# Patient Record
Sex: MALE | Race: WHITE
[De-identification: names, ages, dates, MRNs, and addresses within clinical notes are randomized per-mention and may not be internally consistent; named-entity substitution may affect disease eponyms.]

---

## 2020-02-27 PROBLEM — Z00.00 ENCOUNTER FOR PREVENTIVE HEALTH EXAMINATION: Status: ACTIVE | Noted: 2020-02-27

## 2020-03-02 ENCOUNTER — APPOINTMENT (OUTPATIENT)
Dept: COLORECTAL SURGERY | Facility: CLINIC | Age: 40
End: 2020-03-02
Payer: COMMERCIAL

## 2020-03-02 VITALS
BODY MASS INDEX: 22.73 KG/M2 | HEIGHT: 68 IN | OXYGEN SATURATION: 97 % | HEART RATE: 68 BPM | WEIGHT: 150 LBS | SYSTOLIC BLOOD PRESSURE: 108 MMHG | DIASTOLIC BLOOD PRESSURE: 72 MMHG

## 2020-03-02 DIAGNOSIS — Z86.19 PERSONAL HISTORY OF OTHER INFECTIOUS AND PARASITIC DISEASES: ICD-10-CM

## 2020-03-02 DIAGNOSIS — Z80.9 FAMILY HISTORY OF MALIGNANT NEOPLASM, UNSPECIFIED: ICD-10-CM

## 2020-03-02 DIAGNOSIS — G47.30 SLEEP APNEA, UNSPECIFIED: ICD-10-CM

## 2020-03-02 DIAGNOSIS — Z82.61 FAMILY HISTORY OF ARTHRITIS: ICD-10-CM

## 2020-03-02 PROCEDURE — 46922 EXCISION OF ANAL LESION(S): CPT

## 2020-03-02 PROCEDURE — 99203 OFFICE O/P NEW LOW 30 MIN: CPT | Mod: 25

## 2020-03-02 RX ORDER — EMTRICITABINE AND TENOFOVIR DISOPROXIL FUMARATE 167; 250 MG/1; MG/1
TABLET, FILM COATED ORAL
Refills: 0 | Status: ACTIVE | COMMUNITY

## 2020-03-02 NOTE — ASSESSMENT
[FreeTextEntry1] : I have reviewed with the patient the clinical and natural history of human papilloma virus and its relationship to the anus. The risks and associated consequences including sexual transmission, anal warts, anal dysplasia and risk of anal cancer have been outlined. The need for long term surveillance and follow up have been detailed. \par \par Patient is completing Gardasil series. Patient sees Dr Scott who patient states has a smaller anoscope that accommodates his stenosis and is able to complete internal exam\par \par Office based excision of perianal skin lesion performed today\par F/u pathology\par Continue surveillance with Dr Scott\par Further recommendations pending pathology results\par All questions were answered, patient expressed understanding, and is agreeable to this plan.\par \par

## 2020-03-02 NOTE — CONSULT LETTER
[Dear  ___] : Dear  [unfilled], [Consult Letter:] : I had the pleasure of evaluating your patient, [unfilled]. [( Thank you for referring [unfilled] for consultation for _____ )] : Thank you for referring [unfilled] for consultation for [unfilled] [Please see my note below.] : Please see my note below. [Consult Closing:] : Thank you very much for allowing me to participate in the care of this patient.  If you have any questions, please do not hesitate to contact me. [Sincerely,] : Sincerely, [FreeTextEntry2] : Dr. Pedro Luis Moreau\par [address] [place] [address] [address]\[place] [address] [FreeTextEntry3] :  Beatriz Ernst MD\par

## 2020-03-02 NOTE — PROCEDURE
[FreeTextEntry1] : Excision of Anal Condyloma\par \par Pre-procedure Diagnosis: Condylomatous appearing lesions in perianal region\par Post-procedure Diagnosis: Condylomatous appearing lesions in perianal region\par Procedure: Excision of anal condyloma\par Anesthesia: 1% Lidocaine with epinephrine\par Estimated blood loss: minimal\par Specimen: perianal lesion - rule out anal condyloma\par Drain: none\par Complications: none\par \par Indications: Patient presents with history of a lump in the perianal lesion and known history of HPV. On physical exam,  condylomatous appearing lesions were visualized. Risks/benefits/alternatives were discussed and patient agreed to proceed with office based procedure.\par \par Procedure Details: Consent obtained. Patient was placed in a modified prone mary-knife position on the examination table. Upon inspection, a condylomatous appearing lesion was noted in the left lateral, right posterior and anterior midline positions.  Betadine was used to clean the perianal skin and the associated skin was injected with local anesthetic. An excisional biopsy was performed to remove the lesions, which were sent to pathology as specimen individually labeled by location. The base of the wound was cauterized using silver nitrate to achieve hemostasis. The patient tolerated the procedure well.\par \par Post-procedure instructions were reviewed with the patient, including local wound care. All questions answered.\par

## 2020-03-02 NOTE — PHYSICAL EXAM
[FreeTextEntry1] :  Medical assistant present for duration of physical examination\par \par Gen NAD, AOx3\par \par Anorectal Exam:\par Inspection no erythema, induration or fluctuance, no skin excoriation, no fissure, left lateral perianal skin with 7mm area of flat coarse skin changes (patient confirms this is area of concern prompting evaluation), 2-3mm anterior midline condylomatous appearing lesion, 2-3mm right posterior condylomatous appearing lesion\par HARPER nontender, no masses palpated, no blood on gloved finger, stenosis noted with circumferential fibrotic ring\par Anoscopy attempted but unable to easily pass adult anoscope due to stenosis\par \par

## 2020-05-07 ENCOUNTER — APPOINTMENT (OUTPATIENT)
Dept: COLORECTAL SURGERY | Facility: CLINIC | Age: 40
End: 2020-05-07

## 2020-06-25 ENCOUNTER — APPOINTMENT (OUTPATIENT)
Dept: COLORECTAL SURGERY | Facility: CLINIC | Age: 40
End: 2020-06-25
Payer: COMMERCIAL

## 2020-06-25 VITALS
TEMPERATURE: 98.3 F | HEART RATE: 112 BPM | DIASTOLIC BLOOD PRESSURE: 77 MMHG | SYSTOLIC BLOOD PRESSURE: 117 MMHG | BODY MASS INDEX: 21.07 KG/M2 | HEIGHT: 68 IN | WEIGHT: 139 LBS

## 2020-06-25 DIAGNOSIS — A63.0 ANOGENITAL (VENEREAL) WARTS: ICD-10-CM

## 2020-06-25 PROCEDURE — 99213 OFFICE O/P EST LOW 20 MIN: CPT

## 2020-06-25 NOTE — PHYSICAL EXAM
[FreeTextEntry1] : Medical assistant present for duration of physical examination\par \par Gen NAD, AOx3\par Nonlabored breathing\par Ambulating without assistance\par Skin normal color and pigment, no visible lesions or rashes\par \par Anorectal Exam:\par Inspection no erythema, induration or fluctuance, no skin excoriation, no fissure, no perianal lesions visualized or palpated, well healed excision sites\par HARPER nontender, no masses palpated, no blood on gloved finger, stenosis noted with circumferential fibrotic ring, able to accommodate gloved index finger\par Anoscopy attempted but unable to easily pass adult anoscope due to stenosis\par \par

## 2020-06-25 NOTE — HISTORY OF PRESENT ILLNESS
[FreeTextEntry1] : 40 y/o M presents for f/u anal condyloma\par Last seen in the office 3/2/20, excision of condylomatous appearing lesions performed \par \par           1. Right posterior perianal, shave biopsy:\par - Consistent with condyloma acuminatum, see note.\par \par           Note: Clinical correlation is recommended.\par \par           2. Anterior midline perianal skin, shave biopsy:\par               - Consistent with condyloma acuminatum, see note.\par \par           Note: Clinical correlation is recommended.\par \par           3. Left lateral perianal skin, shave biopsy:\par               - Consistent with condyloma acuminatum, see note.\par \par           Note: Clinical correlation is recommended.\par \par H/o HPV that required OR based excision and fulguration with Dr. Deepak Scott 9/18. Developed hemorrhoids following procedure which were treated with additional procedure. Following additional procedures, patient developed anal stenosis. \par \par Self dilating at home with anal plugs previously, has not been doing since last visit.\par Has not seen Dr Scott since last visit.\par Avoiding anal receptive intercourse\par \par BH: unchanged, using miralax to assist with daily BMs\par \par Completed  Gardasil series since last visit\par \par Denies pain, BRB or perianal lumps on self exam\par \par Had COVID 19 clinically, got symptoms 3/7/2020. Was managed as outpatient, sees PCP in follow up. Was not tested with PCR because early in pandemic, but has had antibody testing that is (+) for antibodies\par

## 2020-06-25 NOTE — ASSESSMENT
[FreeTextEntry1] : Patient reassured. Pathology discussed. No clinical lesions at this time.\par \par I have reviewed with the patient the clinical and natural history of human papilloma virus and its relationship to the anus. The risks and associated consequences including sexual transmission, anal warts, anal dysplasia and risk of anal cancer have been outlined. The need for long term surveillance and follow up have been detailed. \par \par Resume at home anal dilation\par Importance of anoscopy reinforced with patient as part of surveillance. \par Cannot accommodate adult anoscope in office at this time. \par Patient state Dr Scott has a smaller anoscope that accommodates his stenosis and is able to complete internal exam. Advise f/u with Dr Scott. Alternatively, discussed with patient EUA can be performed, with anal dilation and anoscopy at time of procedure. \par \par All questions were answered, patient expressed understanding, and is agreeable to this plan.\par

## 2020-08-18 ENCOUNTER — APPOINTMENT (OUTPATIENT)
Dept: COLORECTAL SURGERY | Facility: CLINIC | Age: 40
End: 2020-08-18
Payer: COMMERCIAL

## 2020-08-18 VITALS
BODY MASS INDEX: 21.22 KG/M2 | TEMPERATURE: 98.4 F | SYSTOLIC BLOOD PRESSURE: 115 MMHG | HEART RATE: 80 BPM | DIASTOLIC BLOOD PRESSURE: 78 MMHG | OXYGEN SATURATION: 96 % | HEIGHT: 68 IN | WEIGHT: 140 LBS

## 2020-08-18 PROCEDURE — 99213 OFFICE O/P EST LOW 20 MIN: CPT | Mod: 25

## 2020-08-18 PROCEDURE — 46600 DIAGNOSTIC ANOSCOPY SPX: CPT

## 2020-08-18 NOTE — ASSESSMENT
[FreeTextEntry1] : Patient reassured\par Normal external perianal exam\par Anoscopy without identifiable lesions\par Resume at home anal dilation\par Continue bowel regimen \par F/u in 1 year for continued surveillance or sooner as needed if symptoms arise\par All questions were answered, patient expressed understanding, and is agreeable to this plan.\par

## 2020-08-18 NOTE — PHYSICAL EXAM
[FreeTextEntry1] : Medical assistant present for duration of physical examination\par \par Gen no acute distress, alert and oriented\par Psych calm, pleasant demeanor, responding appropriately to question\par Nonlabored breathing\par Ambulating without assistance\par Skin normal color and pigment, no visible lesions or rashes\par \par Anorectal Exam:\par Inspection no erythema, induration or fluctuance, no skin excoriation, no fissure, no external perianal lesions visualized\par HARPER nontender, no masses palpated, no blood on gloved finger, circumferential fibrotic ring palpated consistent with patient's known stenosis, gloved index finger was used to massage this stenosis\par \par Procedure: Anoscopy with dilation\par \par Pre procedure Diagnosis: anal stenosis, h/o anal condyloma\par Post procedure Diagnosis: anal stenosis, h/o anal condyloma\par Anesthesia: none\par Estimated blood loss: none\par Specimen: none\par Complications: none\par \par Consent obtained. Anoscopy with gentle dilation was performed until a lighted adult anoscope with lubricant jelly was able to be inserted into the anal canal and the entire anal mucosal surface was inspected. Findings included no fissure, nonfriable internal hemorrhoids, no visible masses or lesions, fibrotic circumferential ring of scar tissue consistent with patient's known stenosis\par \par Patient tolerated examination and procedure well.\par \par \par

## 2020-08-18 NOTE — HISTORY OF PRESENT ILLNESS
[FreeTextEntry1] : 39 yo M presents for f/u anal condyloma\par s/p office based excision of condylomatous appearing lesions on 3/2/20 w/ pathology c/w condyloma\par Last seen 6/25/20 for follow up\par Reports he felt bumps 3-4 weeks ago, concerned condyloma have returned\par Denies pain or BPR\par Occasional itching\par \par Hx of HPV that required OR based excision and fulguration with Dr. Deepak Scott 9/2018. Developed hemorrhoids following procedure which were treated with additional procedure. Following additional procedures, patient developed anal stenosis. \par \par Self dilating at home with anal plugs previously, has not performed for the last year as he is concerned about reintroducing warts into the anal canal which \par Has not seen Dr Scott since last visit, will schedule follow up with him if dilation is needed\par Avoiding anal receptive intercourse as would not be able to accommodate.\par Completed Gardasil vaccine series\par \par BH: few attempts daily. Takes miralax daily\par Admits could improve dietary fiber intake, drinks 4-6 cups daily\par Denies ASA/NSAID use

## 2021-02-09 PROBLEM — Z86.16 HISTORY OF COVID-19: Status: RESOLVED | Noted: 2021-02-09 | Resolved: 2021-02-09

## 2021-02-11 ENCOUNTER — APPOINTMENT (OUTPATIENT)
Dept: COLORECTAL SURGERY | Facility: CLINIC | Age: 41
End: 2021-02-11
Payer: COMMERCIAL

## 2021-02-11 VITALS
BODY MASS INDEX: 22.73 KG/M2 | TEMPERATURE: 97.6 F | WEIGHT: 150 LBS | SYSTOLIC BLOOD PRESSURE: 113 MMHG | HEART RATE: 96 BPM | HEIGHT: 68 IN | DIASTOLIC BLOOD PRESSURE: 77 MMHG

## 2021-02-11 DIAGNOSIS — Z86.16 PERSONAL HISTORY OF COVID-19: ICD-10-CM

## 2021-02-11 PROCEDURE — 99072 ADDL SUPL MATRL&STAF TM PHE: CPT

## 2021-02-11 PROCEDURE — 46600 DIAGNOSTIC ANOSCOPY SPX: CPT

## 2021-02-11 NOTE — PHYSICAL EXAM
[FreeTextEntry1] : Medical assistant present for duration of physical examination\par \par Gen NAD, AOx3\par Nonlabored breathing\par Ambulating without assistance\par Skin normal color and pigment, no visible lesions or rashes\par \par Anal pap performed\par \par Anorectal Exam\par Inspection no erythema, induration or fluctuance, no skin excoriation, no fissures, no perianal lesions visualized or palpated\par HARPER nontender, no masses palpated, no blood on gloved finger, mild stenosis noted with fibrosis noted left lateral extending posteriorly to right posterior quadrant, able to easily accommodate gloved index finger, gentle massage performed\par \par Procedure: Anoscopy\par \par Pre procedure Diagnosis: anal stenosis, h/o anal condyloma\par Post procedure Diagnosis: anal stenosis, h/o anal condyloma\par Anesthesia: none\par Estimated blood loss: none\par Specimen: none\par Complications: none\par \par Consent obtained. Anoscopy was performed by passing a lighted anoscope with lubricant jelly into the anal canal and the entire anal mucosal surface was inspected. Standard anoscope was able to be easily inserted into anal canal. Findings included no fissure, nonfriable internal hemorrhoids, no visible masses or lesions, posterior and left sided fibrosis consistent with patient's known stenosis.\par \par Patient tolerated examination and procedure well.\par \par \par \par

## 2021-02-11 NOTE — ASSESSMENT
[FreeTextEntry1] : 39 yo M with history of HPV associated condyloma and anal stenosis.\par \par I have reviewed with the patient the clinical and natural history of human papilloma virus and its relationship to the anus. The risks and associated consequences including sexual transmission, anal warts, anal dysplasia and risk of anal cancer have been outlined. The need for long term surveillance and follow up have been detailed. \par \par No evidence of condyloma or lesions at this time. Patient is approximately one year removed from last excision and fulguration of lesions. Anal pap performed today. Discussed with patient that further surveillance will depend on results of pap test but anticipate yearly follow up, or sooner if lesions present.\par \par Patient has been doing home dilation. Reiterated value of doing dilation at home (vs undergoing procedure and sedation for similar dilations). Discussed the potential role of a flap or stricturoplasty, however patient currently able to accommodate a finger and anoscope exam with current practices, and has no concern for stenosis compromising defecation. Surgical procedures have increased risk of incontinence and are not indicated at this time.\par \par He will continue home dilation at this time, and follow up in 1 year or sooner if clinical status changes. \par All questions were answered, patient expressed understanding, and is agreeable to this plan.

## 2021-02-11 NOTE — HISTORY OF PRESENT ILLNESS
[FreeTextEntry1] : 39 y/o M presents for f/u anal stenosis and condyloma\par PSH of fulguration of condyloma in the OR in 2018 with Dr. Scott and in office 3/2/20 with our office. H/o hemorrhoidectomy 2018 with Dr. Scott\par \par Last seen in the office on 8/18/20. Circumferential fibrotic ring palpated on HARPER and fibrotic circumferential ring of scar tissue observed on anoscope exam. Patient was advised to resume self dilation at home.\par \par Patient following up for evaluation of condyloma. Denies any palpable lesions or abnormality, does not feel that he has any warts at this time but wants to be diligent with surveillance. Last exam approximately 6 months ago, last procedure 03/2020.\par \par Also has been using dilators for stenosis, but infrequently. Currently using smallest size, but has difficulty with consistent use.\par \par BH: reports regular, smooth bowel movements, on Miralax daily, minimal bloating but no feeling of incomplete evacuation\par \par MSM, (+) anal receptive sex in the past\par HIV (-), on PrEP with [employer]\par Completed Gardasil vaccine\par Last colonoscopy completed September 2018\par Taken no ASA/NSAIDs last 7 days

## 2021-02-16 LAB — ANAL PAP CYTOLOGY: NORMAL

## 2021-08-31 ENCOUNTER — APPOINTMENT (OUTPATIENT)
Dept: COLORECTAL SURGERY | Facility: CLINIC | Age: 41
End: 2021-08-31
Payer: COMMERCIAL

## 2021-08-31 VITALS
TEMPERATURE: 97.9 F | BODY MASS INDEX: 21.98 KG/M2 | HEIGHT: 68 IN | HEART RATE: 73 BPM | WEIGHT: 145 LBS | DIASTOLIC BLOOD PRESSURE: 73 MMHG | SYSTOLIC BLOOD PRESSURE: 116 MMHG

## 2021-08-31 PROCEDURE — 46600 DIAGNOSTIC ANOSCOPY SPX: CPT

## 2021-08-31 NOTE — ASSESSMENT
[FreeTextEntry1] : Exam findings and diagnosis were discussed at length with patient.\par No acute findings on internal and external perianal examination today.\par Continue surveillance.\par Due for anal pap in Feb 2022\par Continue bowel regimen.\par Consider resuming home dilation.\par All questions were answered, patient expressed understanding, and is agreeable to this plan.\par

## 2021-08-31 NOTE — PHYSICAL EXAM
[FreeTextEntry1] : Medical assistant present for duration of physical examination\par \par General no acute distress, alert and oriented\par Psych calm, pleasant demeanor, responding appropriately to questions\par Nonlabored breathing\par Ambulating without assistance\par Skin normal color and pigment, no visible lesions or rashes\par \par Anorectal Exam:\par Inspection no erythema, induration or fluctuance, mild skin excoriation, no fissure, no external hemorrhoidal inflammation, hirsute, no perianal skin lesions visualized or palpated - patient states he is not able to feel the lesion anymore that he was palpating previously when he performs a self exam in office today\par HARPER nontender, no masses palpated, no blood on gloved finger, fibrotic ring causing mild stenosis extending from posterior anal canal through left side of anal canal to anterior anal canal \par \par Procedure: Anoscopy\par \par Pre procedure Diagnosis: anal stenosis, h/o anal condyloma\par Post procedure Diagnosis: anal stenosis, h/o anal condyloma\par Anesthesia: none\par Estimated blood loss: none\par Specimen: none\par Complications: none\par \par Consent obtained. Anoscopy was performed by passing a lighted anoscope with lubricant jelly into the anal canal and the entire anal mucosal surface was inspected. Standard 18mm diameter anoscope was able to be easily inserted into anal canal. Findings included no fissure, noninflamed internal hemorrhoids, no visible masses or lesions, posterior and left sided fibrosis consistent with patient's known stenosis.\par \par Patient tolerated examination and procedure well.

## 2021-08-31 NOTE — HISTORY OF PRESENT ILLNESS
[FreeTextEntry1] : 42 yo M presents for f/u anal stenosis and condyloma\par PSH of fulguration of condyloma in the OR in 2018 with Dr. Scott and in office fulguration 3/2/20 with our office. H/o hemorrhoidectomy [address] Scott\par \par Last seen in the office on 2/11/21. Mild stenosis with fibrosis noted in the left lateral quadrant and extending posteriorly to the right posterior quadrant noted on HARPER. Gentle message performed. On anoscopy, non-friable internal hemorrhoids and posterior and left sided fibrosis observed. Patient advised to continue with home dilation. Anal pap smear was (-) \par \par Pt noticed a new wart approximately 1 month ago which occasionally itches. He reports some irritation which he attributed to occasional over wiping after BMs w/ scant BRB noted on TP\par Patient admits he stopped anal dilation over 1 year ago as he was afraid of introducing warts internally. He continues Miralax daily w/ 3-4 BMs daily of softer/formed stools\par Admits he could improve dietary fiber intake\par \par MSM, (+) anal receptive sex in the past\par HIV (-), on PrEP with [employer]\par Completed Gardasil vaccine\par Last colonoscopy completed September 2018\par Denies ASA/NSAID use

## 2022-12-06 ENCOUNTER — APPOINTMENT (OUTPATIENT)
Dept: COLORECTAL SURGERY | Facility: CLINIC | Age: 42
End: 2022-12-06

## 2022-12-06 VITALS
BODY MASS INDEX: 22.43 KG/M2 | RESPIRATION RATE: 16 BRPM | TEMPERATURE: 98.2 F | WEIGHT: 148 LBS | HEIGHT: 68 IN | SYSTOLIC BLOOD PRESSURE: 114 MMHG | HEART RATE: 78 BPM | DIASTOLIC BLOOD PRESSURE: 72 MMHG | OXYGEN SATURATION: 97 %

## 2022-12-06 PROCEDURE — 46600 DIAGNOSTIC ANOSCOPY SPX: CPT

## 2022-12-06 NOTE — PHYSICAL EXAM
[FreeTextEntry1] : Medical assistant present for duration of physical examination\par \par General no acute distress, alert and oriented\par Psych calm, pleasant, in good spirits\par Nonlabored breathing\par Ambulating without assistance\par Skin no visible lesions or rashes or skin changes\par \par Anal pap smear performed. \par \par Anorectal Exam:\par Inspection no skin changes, no fissure, external hemorrhoidal cushions without inflammation or tag formation, no perianal skin lesions visualized or palpated \par HARPER nontender, no masses palpated, no blood on gloved finger, fibrotic ring causing mild stenosis extending from posterior anal canal through left side of anal canal to anterior anal canal \par \par Procedure: Anoscopy\par \par Pre procedure Diagnosis: anal stenosis, h/o anal condyloma\par Post procedure Diagnosis: anal stenosis, h/o anal condyloma\par Anesthesia: none\par Estimated blood loss: none\par Specimen: none\par Complications: none\par \par Consent obtained. Anoscopy was performed by passing a lighted anoscope with lubricant jelly into the anal canal and the entire anal mucosal surface was inspected. Standard 18mm diameter anoscope was able to be easily inserted into anal canal. Findings included no fissure, noninflamed internal hemorrhoids, no visible masses or lesions, posterior and left sided scarring.\par \par Patient tolerated examination and procedure well.

## 2022-12-06 NOTE — HISTORY OF PRESENT ILLNESS
[FreeTextEntry1] : 43 yo M presents for f/u anal condyloma\par PSH of fulguration of condyloma in the OR in 2018 with Dr. Scott and in office fulguration 3/2/20 with our office. H/o hemorrhoidectomy [address] Scott\par Last colonoscopy completed September 2018\par \par Seen in office 2/11/21 Mild stenosis with fibrosis noted in the left lateral quadrant and extending posteriorly to the right posterior quadrant noted on HARPER. Gentle message performed. On anoscopy, non-friable internal hemorrhoids and posterior and left sided fibrosis observed. Patient advised to continue with home dilation. Anal pap smear was (-) \par \par Last seen 8/31/2021 c/o new wart which he noticed the month prior, but wasn't currently able to palpate. Admitted he had stopped anal dilatation one year prior as he was afraid of introducing more warts. Exam did not reveal any perianal skin lesions. HARPER noted fibrotic ring causing mild stenosis extending form posterior anal canal though left side of anal canal to anterior anal canal. Standard 18mm diameter anoscopy was easily inserted into anal canal. Noninflamed internal hemorrhoids observed, as well as posterior and left sided fibrosis c/w known stenosis.\par Pt recommended to consider home dilation and to return in Feb 2022 for anal pap\par \par He presents today for routine follow up.\par Has not see a provider since August 2021 for anorectal examination or pap smear. \par Noticed itchiness and feels an area that has slowly gotten larger in time, in region of itchiness.\par Currently does not feel itchiness.\par Not doing home dilation.\par Reports finger play/insertion into anus - by self and partners. No anally receptive intercourse or anal toy use. \par \par Since last visit, diagnosed with prostatitis and seeing a urologist. Took anti-inflammatory and that helped symptoms. Elimination diet and taking baths. Symptoms improved. Urologist does HARPER at visit, last seen several months ago. \par \andreas Continues to take miralax daily and daily BM. \par No BRBPR or discharge.

## 2022-12-06 NOTE — ASSESSMENT
[FreeTextEntry1] : Exam findings and diagnosis were discussed at length with patient.\par No acute findings on anoscopy.\par Anal pap smear performed.\par Continue surveillance. Further recommendations pending review of cytology results.\par Home dilation and sexual counseling discussed.\par All questions were answered, patient expressed understanding, and is agreeable to this plan.\par

## 2022-12-07 LAB — ANAL PAP CYTOLOGY: NORMAL

## 2023-04-07 ENCOUNTER — NON-APPOINTMENT (OUTPATIENT)
Age: 43
End: 2023-04-07

## 2023-04-24 ENCOUNTER — APPOINTMENT (OUTPATIENT)
Dept: COLORECTAL SURGERY | Facility: CLINIC | Age: 43
End: 2023-04-24
Payer: COMMERCIAL

## 2023-04-24 VITALS
HEART RATE: 58 BPM | DIASTOLIC BLOOD PRESSURE: 72 MMHG | BODY MASS INDEX: 22.43 KG/M2 | SYSTOLIC BLOOD PRESSURE: 111 MMHG | HEIGHT: 68 IN | WEIGHT: 148 LBS | RESPIRATION RATE: 16 BRPM | OXYGEN SATURATION: 99 % | TEMPERATURE: 98.3 F

## 2023-04-24 DIAGNOSIS — K62.4 STENOSIS OF ANUS AND RECTUM: ICD-10-CM

## 2023-04-24 DIAGNOSIS — B97.7 PAPILLOMAVIRUS AS THE CAUSE OF DISEASES CLASSIFIED ELSEWHERE: ICD-10-CM

## 2023-04-24 PROCEDURE — 46600 DIAGNOSTIC ANOSCOPY SPX: CPT

## 2023-04-24 NOTE — HISTORY OF PRESENT ILLNESS
[FreeTextEntry1] : 41 yo M presents for follow up\par \par PSH of fulguration of condyloma in the OR in 2018 with Dr. Scott and in office fulguration 3/2/20 with our office. H/o hemorrhoidectomy [address] Scott\par Last colonoscopy completed September 2018\par \par Seen in office initially in 2020 - 3 visits.\par Followed up 2/11/21 Mild stenosis with fibrosis noted in the left lateral quadrant and extending posteriorly to the right posterior quadrant noted on HARPER. Gentle message performed. On anoscopy, non-friable internal hemorrhoids and posterior and left sided fibrosis observed. Patient advised to continue with home dilation. Anal pap smear was (-) \par \par Seen in follow up 8/31/2021 c/o new wart which he noticed the month prior, but wasn't currently able to palpate. Admitted he had stopped anal dilatation one year prior as he was afraid of introducing more warts. Exam did not reveal any perianal skin lesions. HARPER noted fibrotic ring causing mild stenosis extending form posterior anal canal though left side of anal canal to anterior anal canal. Standard 18mm diameter anoscopy was easily inserted into anal canal. Noninflamed internal hemorrhoids observed, as well as posterior and left sided fibrosis c/w known stenosis.\par Pt recommended to consider home dilation and to return in Feb 2022 for anal pap\par \par Patient seen most recently December 2022 in follow up\par C/o itchiness. Not doing home dilation. Reported finger play with insertion into anus by self and partners\par Exam, including anoscopy with standard 18mm scope noted fibrotic ring causing mild stenosis, no fissure, noninflamed internal hemorrhoids, no visible masses or lesions, posterior and left sided scarring.\par Anal pap smear performed - NEGATIVE FOR INTRAEPITHELIAL LESION OR MALIGNANCY.\par \par Since last visit, sometime in March he developed a wart in the perianal skin, saw a DERM who removed it by freezing. Seen in follow up 2 weeks post removal by DERM.\par \par Feels well now.\par No pain or discharge.\par No BRBPR.\par Returns today for anoscopy exam to evaluate if any warts are internally seen on anoscopy. \par \par Sexually active, not anally receptive with intercourse but does state he participates in finger play and rimming. Gets routine STI testing performed. Last tested last month. \par On [employer] for PREP. \par \par

## 2023-04-24 NOTE — ASSESSMENT
[FreeTextEntry1] : No acute findings on exam. Patient reassured. \par Continue surveillance.\par Due for next anal pap smear December 2023.\par All questions were answered, patient expressed understanding, and is agreeable to this plan.\par

## 2023-04-24 NOTE — PHYSICAL EXAM
[FreeTextEntry1] : Medical assistant present for duration of physical examination\par \par General no acute distress, alert and oriented\par Psych calm, pleasant, in good spirits\par Nonlabored breathing\par Ambulating without assistance\par Skin no visible lesions or rashes or skin changes\par \par Anorectal Exam:\par Inspection no skin changes, no fissure, no external hemorrhoids, no perianal skin lesions visualized or palpated \par HARPER nontender, no masses palpated, no blood on gloved finger, fibrotic ring causing mild stenosis extending from posterior anal canal through left side of anal canal to anterior anal canal \par \par Procedure: Anoscopy\par \par Pre procedure Diagnosis: anal stenosis, h/o anal condyloma\par Post procedure Diagnosis: anal stenosis, h/o anal condyloma\par Anesthesia: none\par Estimated blood loss: none\par Specimen: none\par Complications: none\par \par Consent obtained. Anoscopy was performed by passing a lighted anoscope with lubricant jelly into the anal canal and the entire anal mucosal surface was inspected. Standard 18mm diameter anoscope was able to be easily inserted into anal canal. Findings included no fissure, no internal hemorrhoidal inflammation, no visible lesions, fibrotic scarring.\par \par Patient tolerated examination and procedure well.